# Patient Record
(demographics unavailable — no encounter records)

---

## 2025-02-24 NOTE — HISTORY OF PRESENT ILLNESS
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

## 2025-02-24 NOTE — PLAN
[TextEntry] : Plan CKD stage 3A/HTN: HCTZ 12.5 mg daily HLD: Levothyroxine  Gout: Allopurinol 100 mg daily Maintain physical activity Follow up with recommended workups in 1 year

## 2025-02-24 NOTE — PHYSICAL EXAM
[TextEntry] : Physical Examination: General: Not in acute distress Head: Normocephalic, no lesions Eyes:EOMI, Fundi normal Throat: Clear, no exudates or lesions Chest: Lungs clear, no rales, no rhonchi, no wheezes CVS: S1/S2, RR, no murmurs, no rubs Abdomen: Soft NT/ND, +BS Extremities: Trace edema  Neurological: Alert, awake, no gross focal deficits

## 2025-02-24 NOTE — REVIEW OF SYSTEMS
[TextEntry] : Review of Systems: General: No weight loss, No recent fever, chills HEENT: no headache, no change in vision or hearing Pulmonary: No SOB, or cough  CVS: No chest pain, TAPIA, or change in exercise tolerance  Gastrointestinal: No Nausea/vomiting/constipation/diarrhea : No complaint of dysuria or urinary frequency, no excess foaming  Skin: Denies no new rash, lesions, ulcer  [place] skeletal: No edema  Neurological: No headache, dizziness, vertigo

## 2025-02-24 NOTE — RESULTS/DATA
[TextEntry] : Labs: 2/14/25 141/5.6/102/28/22/1.46/gfr50/ca9.6 Uric acid 7.1 pth 25 vit D 43.8   8/23/24 140/5.4/102/28/19/1.66/gfr43/ca9.7 glu 111 upcr 0.06  2/8/24 141/4.4/105/27/281.6  Reepat CT result from [facility]   3/9/23 142/4.6/105/27/35/1.74 gfr 41 ca 9.7 cbc 5.3/13.8/154  11/19/22 143/5.7/105/26/23/1.46 ca 9.2 CBC 5.2/12.5/191  12/13/21: 142/5.4/108/2417/1.3 Ca 8.4 GFR 55 CBC: 4.5/14/119  6/10/2021 141/4.6/109/25/15/1.1 gfr  ca 8.6 cbc 4.5/13.7/104  2/11/2021 143/4.1/109/29/14/1.08 gfr 69 ca 9.0 cbc 4.4/13.2/125 vit d 25-oh 26 uric acid 6.0  8/6/20 141/3.7/103/28/12/1.2  gfr 40 4.8/16.4 high/47.4  5/28/20 142/4.4107/25/48/1.1 ca 9.1  cbc 6.6/13.9/95 12/9/20: Labs Collect date  5/58/20 WBC 6.6 Hgb 13.9 Plat 95 low CMP: Sodium 142 K 4.4 chloride 105 CO2 28 Glu 102 creatinine 1.1 BUN17 cr 1.6/bun 24 K 4.9/glu 92 Ca 10.1  2/14/19: cr1.2/bun 21 Uric 10.0  11/26/18:cr 1.5/23/gfr 47 K 4.0/glu 83 Uric 10.1 Mg 2.1 Ca 10/p 3.8 Hg 16.3 tsh 5.54/free T4: 1.10  7/23/18:cr 1.3/21/gfr 56 K 3.7/glu 65 Ca 9.5/p 3.5 Mg 1.8 Hg 15.5  3/19/18:3/15/18: cr 1.1/bun 21/gfr 61 K 3.9/glu 63 uric 9.8 Mg 1.8 hct 48.2%  9/14/17: cr 1.2/bun 18/gfr >60 K 3.5/glu 95 Hg 16.8 bnp : 40  5/15/17:cr 1.43/19/gfr 79 K4.4/glu79 Ca 9.4/3.6/d 31/pth 72 Hg 14.8 cr 1.33/18/gfr 55 K 4.3/glu 85

## 2025-02-24 NOTE — ASSESSMENT
[FreeTextEntry1] : Assessment CKD stage 3A: Renal function is unchanged  HTN: controlled Persistent hyperkalemia  Trace peripheral edema He is currently in [facility] at [place] once a [facility]